# Patient Record
(demographics unavailable — no encounter records)

---

## 2024-11-19 NOTE — HISTORY OF PRESENT ILLNESS
[FreeTextEntry1] : This is a 64 year old woman s/p left PMX and SNE with right excisional biopsy and bilateral short vertical scar mastopexies ([place]) on 12/26/14 for an invasive lobular carcinoma, classic morphology, T1cN0(sn,ihc-), 1.2cm, no LVI, all margins negative, ER/MI+, her 2-,, ki67 low proliferation.  Her oncotype DX was 12. 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 She is s/p RTX (Dr. Bhatti. Her last treatment was 3/23/15. She was started on Tamoxifen (Goldberg) on 3/24/2015 and then exemestane and completed 5 years.  Patient last seen by Medical Oncologist (Dr. Goldberg) on 05/23/2019. She has not had vaginal spotting since July 2017. She is s/p endometrial bx 8/21 by Dr. Ryder and it was normal. She has been doing [employer] work outs 6 days a week and watching her diet.  She lost 16 pounds in total but states she's gained back 10.  She is s/p left breast 11N4 USGBx for a new angular hypoechoic lesion on 4/29/19 pathology showed benign breast tissue with fibroadenomatous nodule and lobular atrophy. She is getting allergy testing for possible mastocytosis.  She retired July 31, 2021.  Patient denies any breast complaints today. No new medical history. No new family history of cancer.   She does SBE intermittently. She has not noticed a change in her breast or a breast lump. She has not noticed a change in her nipple or nipple area. She has not noticed a change in the skin of the breast.  She is not experiencing nipple discharge. She is not experiencing breast pain. She has not noticed a lump or lymph node under the armpit.   BREAST CANCER RISK FACTORS Menarche: 13 Date of LMP: 1/13/2015 Menopause: perimenopausal  Grav:  2    Para: 2 Age at first live birth: 33 Nursed: Yes Hysterectomy: No Oophorectomy: No OCP: No HRT: No Last pap/pelvic exam: 01/2024 WNL  no pap.  Related family history: Sister Breast CA age 57 Ashkenazi: No Tyrer-[place]/NCI lifetime risk:N/A BRCA testing: No  Last mammogram: 10/25/2024     Location:  [facility] Report reviewed.                            Images reviewed on PACS. Results: Birads 2 The breasts are heterogeneously dense. No evidence of malignancy.   Last ultrasound: 10/25/2024             Location:  [facility] Report reviewed.                               Images reviewed on PACS. Results: BIRADS 2 No suspicious findings.   Last MRI: 06/25/2024                          Location: [facility] Report reviewed.   Results: BIRADS 2 No MRI evidence of malignancy.

## 2024-11-19 NOTE — HISTORY OF PRESENT ILLNESS
[FreeTextEntry1] : This is a 64 year old woman s/p left PMX and SNE with right excisional biopsy and bilateral short vertical scar mastopexies ([place]) on 12/26/14 for an invasive lobular carcinoma, classic morphology, T1cN0(sn,ihc-), 1.2cm, no LVI, all margins negative, ER/AK+, her 2-,, ki67 low proliferation.  Her oncotype DX was 12. 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 She is s/p RTX (Dr. Bhatti. Her last treatment was 3/23/15. She was started on Tamoxifen (Goldberg) on 3/24/2015 and then exemestane and completed 5 years.  Patient last seen by Medical Oncologist (Dr. Goldberg) on 05/23/2019. She has not had vaginal spotting since July 2017. She is s/p endometrial bx 8/21 by Dr. Ryder and it was normal. She has been doing [employer] work outs 6 days a week and watching her diet.  She lost 16 pounds in total but states she's gained back 10.  She is s/p left breast 11N4 USGBx for a new angular hypoechoic lesion on 4/29/19 pathology showed benign breast tissue with fibroadenomatous nodule and lobular atrophy. She is getting allergy testing for possible mastocytosis.  She retired July 31, 2021.  Patient denies any breast complaints today. No new medical history. No new family history of cancer.   She does SBE intermittently. She has not noticed a change in her breast or a breast lump. She has not noticed a change in her nipple or nipple area. She has not noticed a change in the skin of the breast.  She is not experiencing nipple discharge. She is not experiencing breast pain. She has not noticed a lump or lymph node under the armpit.   BREAST CANCER RISK FACTORS Menarche: 13 Date of LMP: 1/13/2015 Menopause: perimenopausal  Grav:  2    Para: 2 Age at first live birth: 33 Nursed: Yes Hysterectomy: No Oophorectomy: No OCP: No HRT: No Last pap/pelvic exam: 01/2024 WNL  no pap.  Related family history: Sister Breast CA age 57 Ashkenazi: No Tyrer-B[place]/NCI lifetime risk:N/A BRCA testing: No  Last mammogram: 10/25/2024     Location:  [facility] Report reviewed.                            Images reviewed on PACS. Results: Birads 2 The breasts are heterogeneously dense. No evidence of malignancy.   Last ultrasound: 10/25/2024             Location:  [facility] Report reviewed.                               Images reviewed on PACS. Results: BIRADS 2 No suspicious findings.   Last MRI: 06/25/2024                          Location: [facility] Report reviewed.   Results: BIRADS 2 No MRI evidence of malignancy.

## 2024-11-19 NOTE — PHYSICAL EXAM
[Normocephalic] : normocephalic [Atraumatic] : atraumatic [Supple] : supple [No Supraclavicular Adenopathy] : no supraclavicular adenopathy [Examined in the supine and seated position] : examined in the supine and seated position [Symmetrical] : symmetrical [No dominant masses] : no dominant masses in right breast  [No dominant masses] : no dominant masses left breast [No Nipple Retraction] : no left nipple retraction [No Nipple Discharge] : no left nipple discharge [No Axillary Lymphadenopathy] : no left axillary lymphadenopathy [Soft] : abdomen soft [Not Tender] : non-tender [No Edema] : no edema [No Rashes] : no rashes [No Ulceration] : no ulceration [de-identified] : healed short vertical scar [de-identified] : healed short vertical scar, UOQ FGC's , there is scar tissue lower IMF [de-identified] : healed axillary icnsiion

## 2024-11-19 NOTE — CONSULT LETTER
[Dear  ___] : Dear  [unfilled], [Courtesy Letter:] : I had the pleasure of seeing your patient, [unfilled], in my office today. [Please see my note below.] : Please see my note below. [Sincerely,] : Sincerely, [DrShelton  ___] : Dr. DESOUZA [FreeTextEntry3] : Eliana Coffman MS DO\par  Breast Surgeon\par  [facility] \[place] [address]\par

## 2024-11-19 NOTE — PHYSICAL EXAM
[Normocephalic] : normocephalic [Atraumatic] : atraumatic [Supple] : supple [No Supraclavicular Adenopathy] : no supraclavicular adenopathy [Examined in the supine and seated position] : examined in the supine and seated position [Symmetrical] : symmetrical [No dominant masses] : no dominant masses in right breast  [No dominant masses] : no dominant masses left breast [No Nipple Retraction] : no left nipple retraction [No Nipple Discharge] : no left nipple discharge [No Axillary Lymphadenopathy] : no left axillary lymphadenopathy [Soft] : abdomen soft [Not Tender] : non-tender [No Edema] : no edema [No Rashes] : no rashes [No Ulceration] : no ulceration [de-identified] : healed short vertical scar [de-identified] : healed short vertical scar, UOQ FGC's , there is scar tissue lower IMF [de-identified] : healed axillary icnsiion

## 2024-11-19 NOTE — CONSULT LETTER
[Dear  ___] : Dear  [unfilled], [Courtesy Letter:] : I had the pleasure of seeing your patient, [unfilled], in my office today. [Please see my note below.] : Please see my note below. [Sincerely,] : Sincerely, [DrShelton  ___] : Dr. DESOUZA [FreeTextEntry3] : Eliana Coffman MS DO\par  Breast Surgeon\par  [facility] \[place] [address]\par